# Patient Record
Sex: FEMALE | Race: WHITE | Employment: FULL TIME | URBAN - METROPOLITAN AREA
[De-identification: names, ages, dates, MRNs, and addresses within clinical notes are randomized per-mention and may not be internally consistent; named-entity substitution may affect disease eponyms.]

---

## 2017-12-26 ENCOUNTER — HOSPITAL ENCOUNTER (OUTPATIENT)
Dept: PHYSICAL THERAPY | Age: 52
Discharge: HOME | End: 2017-12-26
Payer: COMMERCIAL

## 2017-12-26 PROCEDURE — 97140 MANUAL THERAPY 1/> REGIONS: CPT

## 2017-12-26 PROCEDURE — 97161 PT EVAL LOW COMPLEX 20 MIN: CPT

## 2017-12-26 NOTE — PROGRESS NOTES
[address]   PHYSICAL THERAPY AT [facility] [address]. [place], [address] - Phone: [phone]  Fax: [id number] / [address] [facility]  Patient Name: Rosanna Dudley : 1965   Medical   Diagnosis: Right hip pain [M25.551] Treatment Diagnosis: S/p R THR   Onset Date: 2017     Referral Source: Inez Garza MD Start of [facility]): 2017   Prior Hospitalization: See medical history Provider #: [id number]   Prior Level of Function: Pain with ADLs   Comorbidities: Arthritis, Hx R RTC repair   Medications: Verified on Patient Summary List   The Plan of Care and following information is based on the information from the initial evaluation.   ================================================================  Assessment / march information: Rosanna Dudley is a 46 y.o.  yo female with Dx of Right hip pain [M25.551]. She reports she underwent r THR on 2017 due to pain from arthritis. She had a 2 week course of home health PT. She presents to out patient PT with c/o mild R hip pain (rated 3/10), SIJ discomfort, and reports of ambulating with deviation due to perceived leg length discrepency. (Patient reports x-rays reveal 2 mm difference). On assessment, Ms. Davies Ames with SC. Ambulation without AD reveals mild lateral list.  R hip ROM and strength are WNLs except R ER= 30 degrees (IR, add, flex not tested). Pelvic landmark assessment reveals l posterior innominate.   FOTO= 42%.      ================================================================  Eval Complexity: History LOW Complexity : Zero comorbidities / personal factors that will impact the outcome / POC;  Examination  MEDIUM Complexity : 3 Standardized tests and measures addressing body structure, function, activity limitation and / or participation in recreation ; Presentation LOW Complexity : Stable, uncomplicated ;  Decision Making MEDIUM Complexity : FOTO score of 26-74; Overall Complexity LOW   Problem List: pain affecting function, decrease ROM, decrease strength, impaired gait/ balance, decrease ADL/ functional abilitiies and decrease activity tolerance   Treatment Plan may include any combination of the following: Therapeutic exercise, Therapeutic activities, Neuromuscular re-education, Physical agent/modality, Gait/balance training, Manual therapy and Patient education  Patient / Family readiness to learn indicated by: asking questions, trying to perform skills and interest  Persons(s) to be included in education: patient (P)  Barriers to Learning/Limitations: None  Measures taken:    Patient Goal (s): Make pain go away, increase mobility/ stability   Patient self reported health status: fair  Rehabilitation Potential: good     Short Term Goals: To be accomplished in  2  weeks:  1 Patient will report >= 25% improvement in symptoms with ADLs  2 Patient will be educated in appropriate ROM, stabilization, strengthening exercises. 3 Improve pelvic symmetry to = landmarks for improved gait   Long Term Goals: To be accomplished in  4-6  weeks:  1 Patient to report >= 75% improvement in symptoms with ADLs. 2 Patient will be independent with finalized HEP/ self maintenance. 3 Increase FOTO score >= 62% to indicate improved function with use of CS.  4 Restore full AROM R hip  for improved ADL participation. Frequency / Duration:   Patient to be seen  2-3  times per week for 4-6  weeks:  Patient / Caregiver education and instruction: self care, activity modification and exercises  G-Codes (GP): paramjit  Therapist Signature: Sean Lr PT Date: 24/77/9394   Certification Period:  Time: 1:25 PM   ===================================================================  I certify that the above Physical Therapy Services are being furnished while the patient is under my care.   I agree with the treatment plan and certify that this therapy is necessary. Physician Signature:        Date:       Time:     Please sign and return to [facility] or you may fax the signed copy to [phone]. Thank you.

## 2017-12-26 NOTE — PROGRESS NOTES
PHYSICAL THERAPY - DAILY TREATMENT NOTE    Patient Name: Callum Borges        Date: 2017  : 1965    Patient  Verified: yes  Visit #:   1     Insurance: Payor: Gayatri Yu / Plan: Coleen Marroquin / Product Type: [employer] /      In time: 82 Out time: 125   Total Treatment Time: 55     Medicare Time Tracking (below)   Total Timed Codes (min):   1:1 Treatment Time:       TREATMENT AREA =  Right hip pain [M25.551]    SUBJECTIVE  Pain Level (on 0 to 10 scale):  3  / 10   Medication Changes/New allergies or changes in medical history, any new surgeries or procedures?     NO    If yes, update Summary List   Subjective Functional Status/Changes:  []  No changes reported     See EVAL/ POC         OBJECTIVE  Modalities Rationale: to decrease pain, edema, neural compromise in order to perform ADLs/ rest with improved ease        min [] Estim, type/location:                                      []  att     []  unatt     []  w/US     []  w/ice    []  w/heat    min []  Mechanical Traction: type/lbs                   []  pro   []  sup   []  int   []  cont    []  before manual    []  after manual    min []  Ultrasound, settings/location:      min []  Iontophoresis w/ dexamethasone, location:                                               []  take home patch       []  in clinic    min []  Ice     []  Heat    location/position:     min []  Vasopneumatic Device, press/temp:     min []  Other:    [] Skin assessment post-treatment (if applicable):    []  intact    []  redness- no adverse reaction     []redness - adverse reaction:        5 min Therapeutic Exercise:  [x]  See flow sheet   Rationale:      increase ROM and increase strength to improve the patients ability to perform ADLs     10 min Manual Therapy: MET correction of L post innominate   Rationale:      decrease pain, increase ROM, increase tissue extensibility and decrease trigger points to improve patient's ability to perform ADLs     min Neuromuscular Re-ed:    Rationale:    improve coordination, improve balance, increase proprioception and improved posture, improve motor control to improve the patients ability to perform ADLs     min Gait Training:    Rationale:       min Patient Education:  YES  Reviewed HEP   []  Progressed/Changed HEP based on: Other Objective/Functional Measures:    See EVAL/ POC     Post Treatment Pain Level (on 0 to 10) scale:   3  / 10     ASSESSMENT      [x]  See POC     PLAN  [x]  Upgrade activities as tolerated  Continue plan of care: yes   []  Discharge due to :    []  Other:      Therapist: Patricia Armenta PT    Date: 12/26/2017 Time: 1:24 PM     No future appointments.

## 2017-12-28 ENCOUNTER — HOSPITAL ENCOUNTER (OUTPATIENT)
Dept: PHYSICAL THERAPY | Age: 52
Discharge: HOME | End: 2017-12-28
Payer: COMMERCIAL

## 2017-12-28 PROCEDURE — 97110 THERAPEUTIC EXERCISES: CPT

## 2017-12-28 PROCEDURE — 97140 MANUAL THERAPY 1/> REGIONS: CPT

## 2017-12-28 NOTE — PROGRESS NOTES
PHYSICAL THERAPY - DAILY TREATMENT NOTE    Patient Name: Radha Amaya        Date: 2017  : 1965    Patient  Verified: YES  Visit #:      of   12  Insurance: Payor: Reyes Singer / Plan: Luana Hester / Product Type: [employer] /      In time: 5:50 Out time: 6:40   Total Treatment Time: 50     Medicare Time Tracking (below)   Total Timed Codes (min):  - 1:1 Treatment Time:  -     TREATMENT AREA/ DIAGNOSIS = Right hip pain [M25.551]    SUBJECTIVE  Pain Level (on 0 to 10 scale):  3  / 10   Medication Changes/New allergies or changes in medical history, any new surgeries or procedures? NO    If yes, update Summary List   Subjective Functional Status/Changes:  [x]  No changes reported     Functional improvement    Functional limitation         10 min Manual Therapy: MET to correct L post innominate, DTM to R thigh, R piriformis release, PROM R hip   Rationale:      decrease pain, increase ROM and increase tissue extensibility to improve patient's ability to perform ADLs without pain    40 min Therapeutic Exercise:  [x]  See flow sheet   Rationale:      increase ROM, increase strength and improve coordination to improve the patients ability to perform ADLs without pain          min Patient Education:  Yes    [x] Reviewed HEP   []  Progressed/Changed HEP based on:         Other Objective/Functional Measures:    Initiated LE strengthening program  Good squat form, but limited range  Pt declined ice, to ice at home   Post Treatment Pain Level (on 0 to 10) scale:    10     ASSESSMENT  Assessment/Changes in Function:     Tolerated all therex well,without pain     []  See Progress Note/Recertification   Patient will continue to benefit from skilled PT services to modify and progress therapeutic interventions, address functional mobility deficits, address ROM deficits, address strength deficits, analyze and address soft tissue restrictions, analyze and cue movement patterns and analyze and modify body mechanics/ergonomics to attain remaining goals.    Progress toward goals / Updated goals:    Good PROM     PLAN  [x]  Upgrade activities as tolerated YES Continue plan of care   []  Discharge due to :    []  Other:      Therapist: Silvestre Hamilton PT    Date: 12/28/2017 Time: 6:59 PM        Future Appointments  Date Time Provider Concetta Peraza   1/5/2018 3:30 PM Silvestre Hamilton, PT REHAB [facility] AT [facility]   1/8/2018 3:30 PM Kervin Ellis, PT [facility] AT [facility]   1/10/2018 2:30 PM Silvestre Hamilton, PT [facility] AT [facility]   1/12/2018 3:30 PM Silvestre Hamilton, PT [facility] AT [facility]   1/15/2018 3:30 PM Kervin Mayfieldt, PT [facility] AT [facility]   1/17/2018 3:00 PM Silvestre Hamilton, PT [facility]   1/19/2018 3:00 PM Silvestre Hamilton, PT [facility]   1/22/2018 3:30 PM Kervin Caleb, PT [facility]   1/24/2018 3:00 PM Silvestre Hamilton, PT [facility]   1/26/2018 3:00 PM Silvestre Hamilton, PT [facility] AT [facility]   1/29/2018 5:00 PM Kervin Caleb, PT [facility] AT [facility]   1/31/2018 5:00 PM Kervin Caleb, PT RE[facility] AT [facility]

## 2018-01-08 ENCOUNTER — HOSPITAL ENCOUNTER (OUTPATIENT)
Dept: PHYSICAL THERAPY | Age: 53
Discharge: HOME | End: 2018-01-08
Payer: COMMERCIAL

## 2018-01-08 PROCEDURE — 97110 THERAPEUTIC EXERCISES: CPT

## 2018-01-08 PROCEDURE — 97140 MANUAL THERAPY 1/> REGIONS: CPT

## 2018-01-08 NOTE — PROGRESS NOTES
PHYSICAL THERAPY - DAILY TREATMENT NOTE    Patient Name: Devika Landers        Date: 2018  : 1965   YES Patient  Verified  Visit #:   3   of   12  Insurance: Payor: Michaela Lauren / Plan: Tato Bear / Product Type: [employer] /      In time: 335 Out time: 435   Total Treatment Time: 60     Medicare Time Tracking (below)   Total Timed Codes (min):   1:1 Treatment Time:       TREATMENT AREA = Right hip pain [M25.551]    SUBJECTIVE  Pain Level (on 0 to 10 scale):  2  / 10   Medication Changes/New allergies or changes in medical history, any new surgeries or procedures? NO    If yes, update Summary List   Subjective Functional Status/Changes:  []  No changes reported     Not too bad right now        OBJECTIVE    40 min Therapeutic Exercise:  [x]  See flow sheet   Rationale:      increase ROM and increase strength to improve the patients ability to amb/ perform ADLs and work activity     20 min Manual Therapy: Technique:      [] STM[]IASTM[x]TPR[]PROM[] Stretching  [] SOR[] man traction[x] MET for pelvic alignment  []OP with REIL  []Jt manipulation []Gr I [] II []  III [] IV[] V[]    Treatment Area: R hip/ ITB / piriformis   Rationale:      decrease pain, increase ROM, increase tissue extensibility and decrease trigger points to improve patient's ability to amb/ perform work activity          throughout Rx min Patient Education:  YES  Reviewed HEP   []  Progressed/Changed HEP based on:            Other Objective/Functional Measures:    R ant innominate- improved symmetry after manual  TP in piriformis     Post Treatment Pain Level (on 0 to 10) scale:   2-3  / 10     ASSESSMENT  Assessment/Changes in Function:     Demos good technique with there ex     []  See Progress Note/Recertification   Patient will continue to benefit from skilled PT services to modify and progress therapeutic interventions, address functional mobility deficits, address ROM deficits, address strength deficits, analyze and address soft tissue restrictions, analyze and cue movement patterns and assess and modify postural abnormalities to attain remaining goals.    Progress toward goals / Updated goals:    Progressing steadily     PLAN  [x]  Upgrade activities as tolerated YES Continue plan of care   []  Discharge due to :    []  Other:      Therapist: Subhash Doty PT    Date: 1/8/2018 Time: 3:37 PM     Future Appointments  Date Time Provider Concetta Peraza   1/10/2018 2:30 PM Nell Melara PT [facility]   1/12/2018 3:30 PM Nell Melara PT [facility]   1/15/2018 3:30 PM Subhash Doty PT [facility]   1/17/2018 3:00 PM Nell Melara PT [facility]   1/19/2018 3:00 PM Nell Melara PT [facility]   1/22/2018 3:30 PM Subhash Doty PT [facility]   1/24/2018 3:00 PM Nell Melara PT [facility]   1/26/2018 3:00 PM Nell Melara PT [facility]   1/29/2018 5:00 PM Subhash Doty PT [facility]   1/31/2018 5:00 PM Subhash Doty PT [facility] AT [facility]

## 2018-01-10 ENCOUNTER — HOSPITAL ENCOUNTER (OUTPATIENT)
Dept: PHYSICAL THERAPY | Age: 53
Discharge: HOME | End: 2018-01-10
Payer: COMMERCIAL

## 2018-01-10 PROCEDURE — 97110 THERAPEUTIC EXERCISES: CPT

## 2018-01-10 PROCEDURE — 97140 MANUAL THERAPY 1/> REGIONS: CPT

## 2018-01-10 NOTE — PROGRESS NOTES
PHYSICAL THERAPY - DAILY TREATMENT NOTE    Patient Name: Jer Ramirez        Date: 1/10/2018  : 1965   YES Patient  Verified  Visit #:     Insurance: Payor: Sari Perdomo / Plan: Vira Villagran / Product Type: [employer] /      In time: 230 Out time: 325   Total Treatment Time: 55     Medicare Time Tracking (below)   Total Timed Codes (min):   1:1 Treatment Time:       TREATMENT AREA = Right hip pain [M25.551]    SUBJECTIVE  Pain Level (on 0 to 10 scale):  3  / 10   Medication Changes/New allergies or changes in medical history, any new surgeries or procedures? NO    If yes, update Summary List   Subjective Functional Status/Changes:  []  No changes reported     Doing stretches. Did splits yesterday       OBJECTIVE    40 min Therapeutic Exercise:  [x]  See flow sheet   Rationale:      increase ROM, increase strength and improve balance to improve the patients ability to perform recreation activity/ work activity with less pain     15 min Manual Therapy: Technique:      [x] STM[]IASTM[x]TPR[]PROM[] Stretching  [] SOR[] man traction[x] roller to ITB  [x]MET for pelvic symmetry  []Jt manipulation []Gr I [] II []  III [] IV[] V[]    Treatment Area:  Piriformis/ ITB   Rationale:      decrease pain, increase ROM, increase tissue extensibility and decrease trigger points to improve patient's ability to perform ADLs with less pain             min Gait Training:    Rationale:        throughout Rx min Patient Education:  YES  Reviewed HEP   []  Progressed/Changed HEP based on:            Other Objective/Functional Measures:    fatiques easily with hip ER     Post Treatment Pain Level (on 0 to 10) scale:   3-4  / 10     ASSESSMENT  Assessment/Changes in Function:     Functional improvement:  Demos good form with there ex   Functional limitation:  Not working      []  See Progress Note/Recertification   Patient will continue to benefit from skilled PT services to modify and progress therapeutic interventions, address functional mobility deficits, address ROM deficits, address strength deficits, analyze and address soft tissue restrictions and address imbalance/dizziness to attain remaining goals.    Progress toward goals / Updated goals:    Progressing steadily     PLAN  []  Upgrade activities as tolerated YES Continue plan of care   []  Discharge due to :    []  Other:      Therapist: Subhash Doty PT    Date: 1/10/2018 Time: 2:31 PM     Future Appointments  Date Time Provider Concetta Peraza   1/12/2018 3:30 PM Nell Melara PT [facility]   1/15/2018 3:30 PM Subhash Doty PT [facility]   1/17/2018 3:00 PM Nell Melara PT [facility]   1/19/2018 3:00 PM Nell Melara PT [facility]   1/22/2018 3:30 PM Subhash Doty PT [facility]   1/24/2018 3:00 PM Nell Melara PT [facility]   1/26/2018 3:00 PM Nell Melara PT [facility]   1/29/2018 5:00 PM Subhash Doty PT [facility]   1/31/2018 5:00 PM Subhash Doty PT [facility] AT [facility]

## 2018-01-12 ENCOUNTER — HOSPITAL ENCOUNTER (OUTPATIENT)
Dept: PHYSICAL THERAPY | Age: 53
Discharge: HOME | End: 2018-01-12
Payer: COMMERCIAL

## 2018-01-12 PROCEDURE — 97110 THERAPEUTIC EXERCISES: CPT

## 2018-01-12 PROCEDURE — 97140 MANUAL THERAPY 1/> REGIONS: CPT

## 2018-01-12 NOTE — PROGRESS NOTES
PHYSICAL THERAPY - DAILY TREATMENT NOTE    Patient Name: Srinivas Living        Date: 2018  : 1965    Patient  Verified: YES  Visit #:   3:15   of   4:35  Insurance: Payor: Horacio Watts / Plan: Isamar Calles / Product Type: [employer] /      In time: 3:15 Out time: 4:35   Total Treatment Time: 65     Medicare Time Tracking (below)   Total Timed Codes (min):  - 1:1 Treatment Time:  -     TREATMENT AREA/ DIAGNOSIS = Right hip pain [M25.551]    SUBJECTIVE  Pain Level (on 0 to 10 scale):  4  / 10   Medication Changes/New allergies or changes in medical history, any new surgeries or procedures? NO    If yes, update Summary List   Subjective Functional Status/Changes:  []  No changes reported     Functional improvement    Functional limitation My r hip hurts      OBJECTIVE  Modalities Rationale:     decrease edema, decrease inflammation and decrease pain to improve patient's ability to perform ADLs without pain   min [] Estim, type/location:                                      []  att     []  unatt     []  w/US     []  w/ice    []  w/heat    min []  Mechanical Traction: type/lbs                   []  pro   []  sup   []  int   []  cont    []  before manual    []  after manual    min []  Ultrasound, settings/location:      min []  Iontophoresis w/ dexamethasone, location:                                               []  take home patch       []  in clinic   10 min [x]  Ice     []  Heat    location/position:  To low back and R hip    min []  Vasopneumatic Device, press/temp:     min []  Other:    [x] Skin assessment post-treatment (if applicable):    [x]  intact    [x]  redness- no adverse reaction     []redness - adverse reaction:        10 min Manual Therapy: MET to correct R ant innominate, R psoas release, DTM to R thigh   Rationale:      decrease pain, increase ROM and increase tissue extensibility to improve patient's ability to perform ADLs without pain    40 min Therapeutic Exercise:  [x] See flow sheet   Rationale:      increase ROM and increase strength to improve the patients ability to perform ADLs without pain          min Patient Education:  Yes    [x] Reviewed HEP   []  Progressed/Changed HEP based on: Other Objective/Functional Measures:    Pt with R psoas tightness and R hip pain  Pt with R LE longer in supine and R ASIS lower (R ant innominate), which was corrected with MET  Her R ant innominate returned with therex and was again corrected with MET    Post Treatment Pain Level (on 0 to 10) scale:   3  / 10     ASSESSMENT  Assessment/Changes in Function:     Less pain with correction of R ant inominate  Pt with full PROM in R hip, needs to focus on strength      []  See Progress Note/Recertification   Patient will continue to benefit from skilled PT services to modify and progress therapeutic interventions, address functional mobility deficits, address ROM deficits, address strength deficits, analyze and address soft tissue restrictions, analyze and cue movement patterns and analyze and modify body mechanics/ergonomics to attain remaining goals.    Progress toward goals / Updated goals:    Full PROM     PLAN  [x]  Upgrade activities as tolerated YES Continue plan of care   []  Discharge due to :    []  Other:      Therapist: Cyndi Pereira PT    Date: 1/12/2018 Time: 3:18 PM        Future Appointments  Date Time Provider Concetta Peraza   1/12/2018 3:30 PM Cyndi Pereira PT [facility]   1/15/2018 3:30 PM Aydee Mtz PT [facility]   1/17/2018 3:00 PM Cyndi Pereira PT [facility]   1/19/2018 3:00 PM Cyndi Pereira PT [facility]   1/22/2018 3:30 PM Aydee Mtz PT [facility]   1/24/2018 3:00 PM Cyndi Pereira PT [facility]   1/26/2018 3:00 PM Cyndi Pereira PT [facility]   1/29/2018 5:00 PM Aydee Mtz PT [facility]   1/31/2018 5:00 PM Aydee Mtz, PT [facility] AT [facility]

## 2018-01-15 ENCOUNTER — HOSPITAL ENCOUNTER (OUTPATIENT)
Dept: PHYSICAL THERAPY | Age: 53
Discharge: HOME | End: 2018-01-15
Payer: COMMERCIAL

## 2018-01-15 PROCEDURE — 97110 THERAPEUTIC EXERCISES: CPT

## 2018-01-15 PROCEDURE — 97140 MANUAL THERAPY 1/> REGIONS: CPT

## 2018-01-15 NOTE — PROGRESS NOTES
PHYSICAL THERAPY - DAILY TREATMENT NOTE    Patient Name: Jeffery Solomon        Date: 1/15/2018  : 1965   YES Patient  Verified  Visit #:     Insurance: Payor: Bo Randolph / Plan: Elpidio Calderon / Product Type: [employer] /      In time: 320 Out time: 430   Total Treatment Time: 70     Medicare Time Tracking (below)   Total Timed Codes (min):   1:1 Treatment Time:       TREATMENT AREA = Right hip pain [M25.551]    SUBJECTIVE  Pain Level (on 0 to 10 scale):  3-4   10   Medication Changes/New allergies or changes in medical history, any new surgeries or procedures? NO    If yes, update Summary List   Subjective Functional Status/Changes:  []  No changes reported     R hip flexor is just too tight        OBJECTIVE    50 min Therapeutic Exercise:  [x]  See flow sheet   Rationale:      increase ROM and increase strength to improve the patients ability to amb/ return to work outsd     20 min Manual Therapy: Technique:      [x] STM[]IASTM[x]TPR[]PROM[x] Stretching- hip flexors  [] SOR[] man traction[]   []OP with REIL  []Jt manipulation []Gr I [] II []  III [] IV[] V[]    Treatment Area: psoas, piriformis/ ITB    Rationale:      decrease pain, increase ROM, increase tissue extensibility and decrease trigger points to improve patient's ability to perform ADLs with less pain              throughout Rx min Patient Education:  YES  Reviewed HEP   []  Progressed/Changed HEP based on:            Other Objective/Functional Measures:    Tight r psoas     Post Treatment Pain Level (on 0 to 10) scale:   3-4   10     ASSESSMENT  Assessment/Changes in Function:     Functional improvement:  Easier to get in/ out of car, able to walk longer distances, able to sit on floor   Functional limitation:  Discomfort on stairs      []  See Progress Note/Recertification   Patient will continue to benefit from skilled PT services to modify and progress therapeutic interventions, address functional mobility deficits, address ROM deficits, address strength deficits, analyze and address soft tissue restrictions and address imbalance/dizziness to attain remaining goals.    Progress toward goals / Updated goals:    Progressing function     PLAN  [x]  Upgrade activities as tolerated YES Continue plan of care   []  Discharge due to :    []  Other:      Therapist: Yvette Caro PT    Date: 1/15/2018 Time: 3:25 PM     Future Appointments  Date Time Provider Concetta Peraza   1/15/2018 3:30 PM Yvette Caro PT [facility]   1/17/2018 3:00 PM Ariadna Flores, PT [facility] AT [facility]   1/19/2018 3:00 PM Ariadna Flores, PT [facility]   1/22/2018 3:30 PM Yvette Caro PT [facility] AT [facility]   1/24/2018 3:00 PM Ariadna Flores, PT [facility]   1/26/2018 3:00 PM Ariadna Flores, PT [facility] AT [facility]   1/29/2018 5:00 PM Yvette Caro PT [facility] AT [facility]   1/31/2018 5:00 PM Yvette Caro PT [facility] AT [facility]

## 2018-01-17 ENCOUNTER — HOSPITAL ENCOUNTER (OUTPATIENT)
Dept: PHYSICAL THERAPY | Age: 53
Discharge: HOME | End: 2018-01-17
Payer: COMMERCIAL

## 2018-01-17 PROCEDURE — 97110 THERAPEUTIC EXERCISES: CPT

## 2018-01-17 PROCEDURE — 97140 MANUAL THERAPY 1/> REGIONS: CPT

## 2018-01-17 NOTE — PROGRESS NOTES
PHYSICAL THERAPY - DAILY TREATMENT NOTE    Patient Name: Gisela Rendon        Date: 2018  : 1965    Patient  Verified: YES  Visit #:     Insurance: Payor: Nelsy Jansen / Plan: Guero Joannbill / Product Type: [employer] /      In time: 2:55 Out time: 3:45   Total Treatment Time: 50     Medicare Time Tracking (below)   Total Timed Codes (min):  - 1:1 Treatment Time:  -     TREATMENT AREA/ DIAGNOSIS = Right hip pain [M25.551]    SUBJECTIVE  Pain Level (on 0 to 10 scale):  3   10   Medication Changes/New allergies or changes in medical history, any new surgeries or procedures? NO    If yes, update Summary List   Subjective Functional Status/Changes:  []  No changes reported     Functional improvement    Functional limitation my low back is sore        10 min Manual Therapy: MET to correct L post innominate   Rationale:      decrease pain, increase ROM and increase tissue extensibility to improve patient's ability to perform ADLs without pain    40 min Therapeutic Exercise:  [x]  See flow sheet   Rationale:      increase ROM, increase strength and improve balance to improve the patients ability to perform ADLs without pain          min Patient Education:  Yes    [x] Reviewed HEP   []  Progressed/Changed HEP based on: Other Objective/Functional Measures:    Pt with L post innominate corrected with MET   Post Treatment Pain Level (on 0 to 10) scale:   2   10     ASSESSMENT  Assessment/Changes in Function:     Pt with LBP from L post innominate     []  See Progress Note/Recertification   Patient will continue to benefit from skilled PT services to modify and progress therapeutic interventions, address functional mobility deficits, address ROM deficits, address strength deficits, analyze and address soft tissue restrictions, analyze and cue movement patterns and analyze and modify body mechanics/ergonomics to attain remaining goals.    Progress toward goals / Updated goals:    Improving pain     PLAN  [x]  Upgrade activities as tolerated YES Continue plan of care   []  Discharge due to :    []  Other:      Therapist: Agnes Rodrigez PT    Date: 1/17/2018 Time: 4:39 PM        Future Appointments  Date Time Provider Concetta Peraza   1/19/2018 3:00 PM Agnes Rodrigez PT REHAB [facility] AT [facility]   1/22/2018 3:30 PM Perry Cea, PT REHAB CENTER AT [facility]   1/24/2018 3:00 PM Agnes Rodrigez PT REHAB CENTER AT [facility]   1/26/2018 3:00 PM Agnes Rodrigez PT REHAB CENTER AT [facility]   1/29/2018 5:00 PM Perry Galvan, PT REHAB CENTER AT [facility]   1/31/2018 5:00 PM Perry Cea, PT REHAB CENTER AT [facility]

## 2018-01-19 ENCOUNTER — APPOINTMENT (OUTPATIENT)
Dept: PHYSICAL THERAPY | Age: 53
End: 2018-01-19
Payer: COMMERCIAL

## 2018-01-22 ENCOUNTER — HOSPITAL ENCOUNTER (OUTPATIENT)
Dept: PHYSICAL THERAPY | Age: 53
Discharge: HOME | End: 2018-01-22
Payer: COMMERCIAL

## 2018-01-22 PROCEDURE — 97110 THERAPEUTIC EXERCISES: CPT

## 2018-01-22 PROCEDURE — 97140 MANUAL THERAPY 1/> REGIONS: CPT

## 2018-01-22 NOTE — PROGRESS NOTES
PHYSICAL THERAPY - DAILY TREATMENT NOTE    Patient Name: Rishi Kwong        Date: 2018  : 1965   YES Patient  Verified  Visit #:     Insurance: Payor: Rodney Burt / Plan: Farheen Judd / Product Type: [employer] /      In time: 325 Out time: 415   Total Treatment Time: 50     Medicare Time Tracking (below)   Total Timed Codes (min):   1:1 Treatment Time:       TREATMENT AREA = Right hip pain [M25.551]    SUBJECTIVE  Pain Level (on 0 to 10 scale):  3-4  / 10   Medication Changes/New allergies or changes in medical history, any new surgeries or procedures? NO    If yes, update Summary List   Subjective Functional Status/Changes:  []  No changes reported     Sore, but did a lot over the weekend. Planning to RTW tomorrow. Walked 5 miles on Sat and then 6.2 on Sun        OBJECTIVE    35 min Therapeutic Exercise:  [x]  See flow sheet   Rationale:      increase ROM, increase strength and improve balance to improve the patients ability to amb/ perform rec activity     15 min Manual Therapy: Technique:      [x] STM[]IASTM[x]TPR[]PROM[] Stretching  [] SOR[] man traction[]   []OP with REIL  []Jt manipulation []Gr I [] II []  III [] IV[] V[]    Treatment Area:  R glut/ quad region, hip flexor   Rationale:      decrease pain, increase ROM, increase tissue extensibility and increase postural awareness to improve patient's ability to perform ADLs/ work/ rec activity        throughout Rx min Patient Education:  YES  Reviewed HEP   []  Progressed/Changed HEP based on:            Other Objective/Functional Measures:    Quad fatiques easily     Post Treatment Pain Level (on 0 to 10) scale:   4  / 10     ASSESSMENT  Assessment/Changes in Function:     Functional improvement:  RTW tomorrow, increased amb duration        []  See Progress Note/Recertification   Patient will continue to benefit from skilled PT services to modify and progress therapeutic interventions, address functional mobility deficits, address ROM deficits, address strength deficits, analyze and address soft tissue restrictions, analyze and cue movement patterns and address imbalance/dizziness to attain remaining goals.    Progress toward goals / Updated goals:    Steady progression     PLAN  [x]  Upgrade activities as tolerated YES Continue plan of care   []  Discharge due to :    []  Other:      Therapist: Subhash Doty PT    Date: 1/22/2018 Time: 3:26 PM     Future Appointments  Date Time Provider Concetta Peraza   1/22/2018 3:30 PM Subhash Doty PT REH[facility] AT [facility]   1/24/2018 3:00 PM Nell Melara PT RE[facility]ENTER AT [facility]   1/26/2018 3:00 PM Nell Melara PT RE[facility]ENTER AT [facility]   1/29/2018 5:00 PM Subhash Doty PT REH[facility]ENTER AT [facility]   1/31/2018 5:00 PM Subhash Doty PT REHAB CENTER AT [facility]

## 2018-01-24 ENCOUNTER — HOSPITAL ENCOUNTER (OUTPATIENT)
Dept: PHYSICAL THERAPY | Age: 53
Discharge: HOME | End: 2018-01-24
Payer: COMMERCIAL

## 2018-01-24 PROCEDURE — 97140 MANUAL THERAPY 1/> REGIONS: CPT

## 2018-01-24 PROCEDURE — 97110 THERAPEUTIC EXERCISES: CPT

## 2018-01-24 NOTE — PROGRESS NOTES
PHYSICAL THERAPY - DAILY TREATMENT NOTE    Patient Name: Pratima Calvillo        Date: 2018  : 1965    Patient  Verified: YES  Visit #:     Insurance: Payor: Luis Codding / Plan: Coni Galeana / Product Type: [employer] /      In time: 3:00 Out time: 4:20   Total Treatment Time: 80     Medicare Time Tracking (below)   Total Timed Codes (min):  - 1:1 Treatment Time:  -     TREATMENT AREA/ DIAGNOSIS = Right hip pain [M25.551]    SUBJECTIVE  Pain Level (on 0 to 10 scale):  3  / 10   Medication Changes/New allergies or changes in medical history, any new surgeries or procedures? NO    If yes, update Summary List   Subjective Functional Status/Changes:  []  No changes reported     Functional improvement: Pt reports that she has notice an improvement with a lot of daily things like walking longer distances and turning directions while walking. 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        OBJECTIVE  Modalities Rationale:     decrease pain to improve patient's ability to perform ADLs without pain   min [] Estim, type/location:                                      []  att     []  unatt     []  w/US     []  w/ice    []  w/heat    min []  Mechanical Traction: type/lbs                   []  pro   []  sup   []  int   []  cont    []  before manual    []  after manual    min []  Ultrasound, settings/location:      min []  Iontophoresis w/ dexamethasone, location:                                               []  take home patch       []  in clinic   10 min [x]  Ice     []  Heat    location/position: Low back and right hip laying supine    min []  Vasopneumatic Device, press/temp:     min []  Other:    [] Skin assessment post-treatment (if applicable):    []  intact    []  redness- no adverse reaction     []redness - adverse reaction:        15 min Manual Therapy: MET to correct a posteriorly rotated left innominate and anteriorly rotated right innominate   Rationale:      Correct pelvic positioning to improve patient's ability to perform ADLs without pain    50 min Therapeutic Exercise:  [x]  See flow sheet   Rationale:      increase strength, improve coordination, improve balance and increase proprioception to improve the patients ability to perform ADLs without pain          min Patient Education:  Yes    [x] Reviewed HEP   []  Progressed/Changed HEP based on: Other Objective/Functional Measures:    R hip PROM WNL  Left posteriorly rotated innominate  Right anteriorly rotated innominate  FOTO: 53   Post Treatment Pain Level (on 0 to 10) scale:   4  / 10     ASSESSMENT  Assessment/Changes in Function:     Pt shows improvements with therapeutic exercise based ability to perform tasks with increased resistance. Pt still shows decreased hip abduction and extension weakness leading to slight impairments during ambulation. Pt showed decreased core activation. []  See Progress Note/Recertification   Patient will continue to benefit from skilled PT services to modify and progress therapeutic interventions, address functional mobility deficits, address ROM deficits, address strength deficits, analyze and address soft tissue restrictions, analyze and cue movement patterns and analyze and modify body mechanics/ergonomics to attain remaining goals. Progress toward goals / Updated goals:    Continue with current treatment plan targeting R hip strengthening and core strengthening/stabilization.        PLAN  [x]  Upgrade activities as tolerated YES Continue plan of care   []  Discharge due to :    []  Other:      Therapist: Lucille Klein    Date: 1/24/2018 Time: 4:15 PM        Future Appointments  Date Time Provider Concetta Peraza   1/26/2018 3:00 PM Maynor Smith PT REHAB CENTER AT [facility]   1/29/2018 5:00 PM Tiana Desai PT REHAB CENTER AT [facility]   1/31/2018 5:00 PM Tiana Desai, PT REHAB CENTER AT [facility]

## 2018-01-24 NOTE — PROGRESS NOTES
[facility] PHYSICAL THERAPY AT [facility] [address] [place], [address]  Phone: [phone]  Fax: [phone]  PROGRESS NOTE  Patient Name: Yordan Tinsley : 1965   Treatment/Medical Diagnosis: Right hip pain [M25.551]   Referral Source: Kanu Thurston MD     Date of Initial Visit: 17 Attended Visits: 9 Missed Visits: 0     SUMMARY OF TREATMENT  Manual therapy, including massage, PROM and Muscle-energy techniques to correct L posterior innominate. Core stability program.  LE strengthening program, with focus on glutes. Education on HEP and self-care. CURRENT STATUS  The patient has made good progress. SHe has full PROM. Her AROM is full as well, except for acitve ER, which is limited to 20 degrees. Her progress has been limited by a recurrent L post innominate, which we are able to correct, but quickly goes back out of alignment. Goal/Measure of Progress Goal Met? 1.  pt to report 75% improvement   Status at last Eval: - Current Status: 35% improved progressing   2. Independent with HEP   Status at last Eval: No HEP Current Status: Learning a good HEP progressing   3. Increase FOTO score to >=62%, to indicate increased function    Status at last Eval: 42 Current Status: 53 progressing   4. Restore full AROM to R hip   Status at last Eval: Limited R hip AROM Current Status: Full R hip AROM, except for ER = 20  progressing     New Goals to be achieved in __4-5__  weeks:  1. All 4 above LTGs still valid   2.  -   3.  -   4.  -   RECOMMENDATIONS  Continue PT 2x/week x 4-5 weeks  If you have any questions/comments please contact us directly at [phone]. Thank you for allowing us to assist in the care of your patient. Therapist Signature: Lynett Snellen, PT, MDT Date: 2018     Time: 3:58 PM   NOTE TO PHYSICIAN:  PLEASE COMPLETE THE ORDERS BELOW AND FAX TO   [facility] [place]: [phone].   If you are unable to process this request in 24 hours please contact our office: [phone].    ___ I have read the above report and request that my patient continue as recommended.   ___ I have read the above report and request that my patient continue therapy with the following changes/special instructions:_________________________________________________________   ___ I have read the above report and request that my patient be discharged from therapy.      Physician Signature:        Date:       Time:

## 2018-01-26 ENCOUNTER — HOSPITAL ENCOUNTER (OUTPATIENT)
Dept: PHYSICAL THERAPY | Age: 53
Discharge: HOME | End: 2018-01-26
Payer: COMMERCIAL

## 2018-01-26 PROCEDURE — 97110 THERAPEUTIC EXERCISES: CPT

## 2018-01-26 PROCEDURE — 97140 MANUAL THERAPY 1/> REGIONS: CPT

## 2018-01-26 NOTE — PROGRESS NOTES
PHYSICAL THERAPY - DAILY TREATMENT NOTE    Patient Name: Pam Cam        Date: 2018  : 1965    Patient  Verified: YES  Visit #:   10   of   12  Insurance: Payor: Drea Banda / Plan: Rusty Conte / Product Type: [employer] /      In time: 3 Out time: 4:20   Total Treatment Time: 80     Medicare Time Tracking (below)   Total Timed Codes (min):  - 1:1 Treatment Time:  -     TREATMENT AREA/ DIAGNOSIS = Right hip pain [M25.551]    SUBJECTIVE  Pain Level (on 0 to 10 scale):  3  / 10   Medication Changes/New allergies or changes in medical history, any new surgeries or procedures?     NO    If yes, update Summary List   Subjective Functional Status/Changes:  []  No changes reported     Functional improvement it feels better than it did this morning   Functional limitation pain in the morning      OBJECTIVE  Modalities Rationale:     decrease edema, decrease inflammation and decrease pain to improve patient's ability to perform ADLs without pain   min [] Estim, type/location:                                      []  att     []  unatt     []  w/US     []  w/ice    []  w/heat    min []  Mechanical Traction: type/lbs                   []  pro   []  sup   []  int   []  cont    []  before manual    []  after manual    min []  Ultrasound, settings/location:      min []  Iontophoresis w/ dexamethasone, location:                                               []  take home patch       []  in clinic   10 min [x]  Ice     []  Heat    location/position:     min []  Vasopneumatic Device, press/temp:     min []  Other:    [x] Skin assessment post-treatment (if applicable):    [x]  intact    [x]  redness- no adverse reaction     []redness - adverse reaction:        10 min Manual Therapy: MET to correct R ant innominate X 2, R psoas release   Rationale:      decrease pain, increase ROM and increase tissue extensibility to improve patient's ability to perform ADLs without pain    60 min Therapeutic Exercise: [x]  See flow sheet   Rationale:      increase ROM and increase strength to improve the patients ability to perform ADLs without pain          min Patient Education:  Yes    [x] Reviewed HEP   []  Progressed/Changed HEP based on: Other Objective/Functional Measures:    Pt with R ant innominate, likely related to R psoas shortening  Increased PREs, adding balance lunge and deep squat  Pt was back out of alingement after mat exercises (glute prep), with R ant innominate corrected again  Pt was able to maintain alingement with PREs     Post Treatment Pain Level (on 0 to 10) scale:   2  / 10     ASSESSMENT  Assessment/Changes in Function:     Hard to maintain alingement  Pt instructed how to self correct R SI with home MET of pushing R foot (in 90/90 position) into door jam      []  See Progress Note/Recertification   Patient will continue to benefit from skilled PT services to modify and progress therapeutic interventions, address functional mobility deficits, address ROM deficits, address strength deficits, analyze and address soft tissue restrictions, analyze and cue movement patterns, analyze and modify body mechanics/ergonomics and assess and modify postural abnormalities to attain remaining goals.    Progress toward goals / Updated goals:    progressing well     PLAN  [x]  Upgrade activities as tolerated YES Continue plan of care   []  Discharge due to :    []  Other:      Therapist: Lynett Snellen, PT    Date: 1/26/2018 Time: 3:01 PM        Future Appointments  Date Time Provider Concetta Peraza   1/29/2018 5:00 PM Hugh Darling, PT REHAB CENTER AT [facility]   1/31/2018 5:00 PM Hugh Darling, PT REH[facility] CENTER AT [facility]

## 2018-01-29 ENCOUNTER — HOSPITAL ENCOUNTER (OUTPATIENT)
Dept: PHYSICAL THERAPY | Age: 53
Discharge: HOME | End: 2018-01-29
Payer: COMMERCIAL

## 2018-01-29 PROCEDURE — 97140 MANUAL THERAPY 1/> REGIONS: CPT

## 2018-01-29 PROCEDURE — 97110 THERAPEUTIC EXERCISES: CPT

## 2018-01-29 NOTE — PROGRESS NOTES
PHYSICAL THERAPY - DAILY TREATMENT NOTE    Patient Name: Zarina Larson        Date: 2018  : 1965   YES Patient  Verified  Visit #:     Insurance: Payor: Mik Chua / Plan: Dicie Hammans / Product Type: [employer] /      In time: 417 Out time: 550   Total Treatment Time: 54     Medicare Time Tracking (below)   Total Timed Codes (min):   1:1 Treatment Time:       TREATMENT AREA = Right hip pain [M25.551]    SUBJECTIVE  Pain Level (on 0 to 10 scale):  3  / 10   Medication Changes/New allergies or changes in medical history, any new surgeries or procedures? NO    If yes, update Summary List   Subjective Functional Status/Changes:  []  No changes reported     Sore after last workout. I need the psoas to release        OBJECTIVE    40 min Therapeutic Exercise:  [x]  See flow sheet   Rationale:      increase ROM, increase strength and improve coordination to improve the patients ability to amb/ perform recreation and work activity     15 min Manual Therapy: Technique:      [x] STM[]IASTM[x]TPR[]PROM[x] Stretching- psoas[] SOR[] man traction[]   []OP with REIL  []Jt manipulation []Gr I [] II []  III [] IV[] V[]    Treatment Area:  Psoas release,    Rationale:      decrease pain, increase ROM, increase tissue extensibility and decrease trigger points to improve patient's ability to perform work/ recreation activity              throughout Rx min Patient Education:  YES  Reviewed HEP   []  Progressed/Changed HEP based on: Other Objective/Functional Measures:    Progressing stability thru R LE.   Added SLS clocks     Post Treatment Pain Level (on 0 to 10) scale:   3- 10     ASSESSMENT  Assessment/Changes in Function:     Functional improvement:  RTW        []  See Progress Note/Recertification   Patient will continue to benefit from skilled PT services to modify and progress therapeutic interventions, address functional mobility deficits, address ROM deficits, address strength deficits, analyze and address soft tissue restrictions and address imbalance/dizziness to attain remaining goals.    Progress toward goals / Updated goals:    Steady progress     PLAN  [x]  Upgrade activities as tolerated YES Continue plan of care   []  Discharge due to :    []  Other:      Therapist: Kim Ferguson PT    Date: 1/29/2018 Time: 5:17 PM     Future Appointments  Date Time Provider Concetta Peraza   1/31/2018 5:00 PM Kim Ferguson PT REH[facility]ENTER AT [facility]

## 2018-01-31 ENCOUNTER — HOSPITAL ENCOUNTER (OUTPATIENT)
Dept: PHYSICAL THERAPY | Age: 53
Discharge: HOME | End: 2018-01-31
Payer: COMMERCIAL

## 2018-01-31 PROCEDURE — 97140 MANUAL THERAPY 1/> REGIONS: CPT

## 2018-01-31 PROCEDURE — 97110 THERAPEUTIC EXERCISES: CPT

## 2018-01-31 NOTE — PROGRESS NOTES
PHYSICAL THERAPY - DAILY TREATMENT NOTE    Patient Name: Jayson Taylor        Date: 2018  : 1965   YES Patient  Verified  Visit #:     Insurance: Payor: Tarah Collier / Plan: Valeriano Norris / Product Type: [employer] /      In time: 769 Out time: 600   Total Treatment Time: 65     Medicare Time Tracking (below)   Total Timed Codes (min):   1:1 Treatment Time:       TREATMENT AREA = Right hip pain [M25.551]    SUBJECTIVE  Pain Level (on 0 to 10 scale):  1-2   10   Medication Changes/New allergies or changes in medical history, any new surgeries or procedures? NO    If yes, update Summary List   Subjective Functional Status/Changes:  []  No changes reported     Sore, but getting better         OBJECTIVE    50 min Therapeutic Exercise:  [x]  See flow sheet   Rationale:      increase ROM, increase strength and improve balance to improve the patients ability to perform recreation/ work activity     15 min Manual Therapy: Technique:      [x] STM[]IASTM[x]TPR[]PROM[x] Stretching  [] SOR[] man traction[]   []OP with REIL  []Jt manipulation []Gr I [] II []  III [] IV[] V[]    Treatment Area:  R hip/ psoas   Rationale:      decrease pain, increase ROM, increase tissue extensibility and decrease trigger points to improve patient's ability to return to rec activity/ work           throughout Rx min Patient Education:  YES  Reviewed HEP   []  Progressed/Changed HEP based on:            Other Objective/Functional Measures:    Tight psoas  Able to perform 6 inch lat dip down with good form   Post Treatment Pain Level (on 0 to 10) scale:   3  / 10     ASSESSMENT  Assessment/Changes in Function:     Functional improvement:  Improved stability with SLS clocks        []  See Progress Note/Recertification   Patient will continue to benefit from skilled PT services to modify and progress therapeutic interventions, address functional mobility deficits, address ROM deficits, address strength deficits, analyze and address soft tissue restrictions, analyze and cue movement patterns and address imbalance/dizziness to attain remaining goals.    Progress toward goals / Updated goals:    Progressing strength and stability     PLAN  [x]  Upgrade activities as tolerated YES Continue plan of care   []  Discharge due to :    []  Other:      Therapist: Elaina Corbin PT    Date: 1/31/2018 Time: 5:18 PM     Future Appointments  Date Time Provider Concetta Peraza   2/2/2018 11:00 AM Elaina Corbin PT REHAB CENTER AT [facility]   2/5/2018 4:30 PM Elaina Corbin, PT REHAB CENTER AT [facility]   2/7/2018 4:00 PM Elaina Corbin, PT REHAB CENTER AT [facility]   2/9/2018 3:00 PM Tyler Laurent PT REHAB CENTER AT [facility]   2/12/2018 5:00 PM Elaina Corbin PT REHAB CENTER AT [facility]   2/14/2018 4:30 PM Elaina Corbin PT REH[facility]ENTER AT [facility]   2/16/2018 3:00 PM Tyler Laurent PT REHAB CENTER AT [facility]   2/19/2018 5:00 PM Elaina Corbin, PT REHAB [facility]ENTER AT [facility]   2/21/2018 4:30 PM Elaina Corbin, PT REHAB CENTER AT [facility]

## 2018-02-02 ENCOUNTER — HOSPITAL ENCOUNTER (OUTPATIENT)
Dept: PHYSICAL THERAPY | Age: 53
Discharge: HOME | End: 2018-02-02
Payer: COMMERCIAL

## 2018-02-02 PROCEDURE — 97140 MANUAL THERAPY 1/> REGIONS: CPT

## 2018-02-02 PROCEDURE — 97110 THERAPEUTIC EXERCISES: CPT

## 2018-02-02 NOTE — PROGRESS NOTES
PHYSICAL THERAPY - DAILY TREATMENT NOTE    Patient Name: Ralph Diallo        Date: 2018  : 1965    Patient  Verified: YES  Visit #:   15   of   20  Insurance: Payor: Carl Breed / Plan: Jareth Bald / Product Type: [employer] /      In time: 2:35 Out time: 3:30   Total Treatment Time: 55     Medicare Time Tracking (below)   Total Timed Codes (min):  - 1:1 Treatment Time:  -     TREATMENT AREA/ DIAGNOSIS = Right hip pain [M25.551]    SUBJECTIVE  Pain Level (on 0 to 10 scale):  2  / 10   Medication Changes/New allergies or changes in medical history, any new surgeries or procedures? NO    If yes, update Summary List   Subjective Functional Status/Changes:  []  No changes reported     Functional improvement: Pt reports that she does feel a little better after last visit. Functional limitation: Pt still stiff in the hip and feels like the muscles are tight around the hip. OBJECTIVE      10 min Manual Therapy: DTM to R hip flexors and TFL   Rationale:      decrease pain and increase tissue extensibility to improve patient's ability to perform ADLs without pain    45 min Therapeutic Exercise:  [x]  See flow sheet   Rationale:      increase strength, improve coordination and improve balance to improve the patients ability to perform ADLs without pain          min Patient Education:  Yes    [x] Reviewed HEP   []  Progressed/Changed HEP based on: Other Objective/Functional Measures:    Pt had no increase in pain during treatment session. Pt has full ROM in R Hip   Post Treatment Pain Level (on 0 to 10) scale:   2  / 10     ASSESSMENT  Assessment/Changes in Function:     Pt still has decreased endurance of the R hip musculature. Pt requires occasional to min verbal and tactile instructions from proper mechanics with therapeutic exercises. Pt had increased muscle tone in the R TFL and hip flexors. Pt benefited from manual therapy based on decreased symptoms post treatment.       [] See Progress Note/Recertification   Patient will continue to benefit from skilled PT services to modify and progress therapeutic interventions, address functional mobility deficits, address ROM deficits, address strength deficits, analyze and address soft tissue restrictions, analyze and cue movement patterns, analyze and modify body mechanics/ergonomics and assess and modify postural abnormalities to attain remaining goals. Progress toward goals / Updated goals:    Continue with current treatment plan targeting R hip strengthening and endurance of stabilization musculature.       PLAN  [x]  Upgrade activities as tolerated YES Continue plan of care   []  Discharge due to :    []  Other:      Therapist: Lucille Klein    Date: 2/2/2018 Time: 3:31 PM        Future Appointments  Date Time Provider Concetta Peraza   2/5/2018 4:30 PM Tiana Desai, PT [facility]   2/7/2018 4:00 PM Tiana Desai, PT [facility]   2/9/2018 3:00 PM Maynor Smith, PT [facility] AT [facility]   2/12/2018 5:00 PM Tiana Desai, PT [facility] AT [facility]   2/14/2018 4:30 PM Tiana Desai, PT [facility] AT [facility]   2/16/2018 3:00 PM Maynor Smith, PT [facility] AT [facility]   2/19/2018 5:00 PM Tiana Desai, PT [facility] AT [facility]   2/21/2018 4:30 PM Tiana Desai, PT [facility] AT [facility]

## 2018-02-05 ENCOUNTER — HOSPITAL ENCOUNTER (OUTPATIENT)
Dept: PHYSICAL THERAPY | Age: 53
Discharge: HOME | End: 2018-02-05
Payer: COMMERCIAL

## 2018-02-05 PROCEDURE — 97140 MANUAL THERAPY 1/> REGIONS: CPT

## 2018-02-05 PROCEDURE — 97110 THERAPEUTIC EXERCISES: CPT

## 2018-02-05 NOTE — PROGRESS NOTES
PHYSICAL THERAPY - DAILY TREATMENT NOTE    Patient Name: Jeff Campos        Date: 2018  : 1965   YES Patient  Verified  Visit #:   15   of   20  Insurance: Payor: Merissa Living / Plan: Lemkomely Lennox / Product Type: [employer] /      In time: 440 Out time: 545   Total Treatment Time: 65     Medicare Time Tracking (below)   Total Timed Codes (min):   1:1 Treatment Time:       TREATMENT AREA = Right hip pain [M25.551]    SUBJECTIVE  Pain Level (on 0 to 10 scale):  2   10   Medication Changes/New allergies or changes in medical history, any new surgeries or procedures? NO    If yes, update Summary List   Subjective Functional Status/Changes:  []  No changes reported     Stiff--but improving.   I tried to step up onto a picnic bench but was unable to do so        OBJECTIVE    50 min Therapeutic Exercise:  [x]  See flow sheet   Rationale:      increase ROM, increase strength and improve coordination to improve the patients ability to return to recreational activity     15 min Manual Therapy: Technique:      [x] STM[]IASTM[x]TPR[]PROM[] Stretching  [] SOR[] man traction[x] psoas/ piriformis release  []OP with REIL  []Jt manipulation []Gr I [] II []  III [] IV[] V[]    Treatment Area:  R hip   Rationale:      decrease pain, increase ROM, increase tissue extensibility and decrease trigger points to improve patient's ability to return to recreational activity        Other Objective/Functional Measures:    Initiated agility activity     Post Treatment Pain Level (on 0 to 10) scale:   2-3  / 10     ASSESSMENT  Assessment/Changes in Function:     Functional improvement:  RTW--notes improved endurance        []  See Progress Note/Recertification   Patient will continue to benefit from skilled PT services to modify and progress therapeutic interventions, address functional mobility deficits, address ROM deficits, address strength deficits, analyze and address soft tissue restrictions and analyze and cue movement patterns to attain remaining goals.    Progress toward goals / Updated goals:    Steady progress     PLAN  [x]  Upgrade activities as tolerated YES Continue plan of care   []  Discharge due to :    []  Other:      Therapist: Chelsey Arredondo PT    Date: 2/5/2018 Time: 4:57 PM     Future Appointments  Date Time Provider Concetta Peraza   2/7/2018 4:00 PM Chelsey Arredondo PT [facility]   2/9/2018 3:00 PM Daniel Plascencia, PT [facility]   2/12/2018 5:00 PM Chelsey Arredondo PT [facility]   2/14/2018 4:30 PM Chelsey Arredondo PT [facility]   2/16/2018 3:00 PM Daniel Plascencia, PT [facility]   2/19/2018 5:00 PM Chelsey Arredondo PT [facility]   2/21/2018 4:30 PM Chelsey Arredondo PT [facility]

## 2018-02-07 ENCOUNTER — HOSPITAL ENCOUNTER (OUTPATIENT)
Dept: PHYSICAL THERAPY | Age: 53
Discharge: HOME | End: 2018-02-07
Payer: COMMERCIAL

## 2018-02-07 PROCEDURE — 97110 THERAPEUTIC EXERCISES: CPT

## 2018-02-07 PROCEDURE — 97140 MANUAL THERAPY 1/> REGIONS: CPT

## 2018-02-07 NOTE — PROGRESS NOTES
PHYSICAL THERAPY - DAILY TREATMENT NOTE    Patient Name: Aurelio Duane        Date: 2018  : 1965   YES Patient  Verified  Visit #:     Insurance: Payor: Teresita Quiles / Plan: [employer] / Product Type: [employer] /      In time: 400 Out time: 515   Total Treatment Time: 75     Medicare Time Tracking (below)   Total Timed Codes (min):   1:1 Treatment Time:       TREATMENT AREA = Right hip pain [M25.551]    SUBJECTIVE  Pain Level (on 0 to 10 scale):  3  / 10   Medication Changes/New allergies or changes in medical history, any new surgeries or procedures? NO    If yes, update Summary List   Subjective Functional Status/Changes:  []  No changes reported     More sore where everything attaches. Did a lot of TP work with lacrosse ball/ tennis ball after walking the dog last night. OBJECTIVE    60 min Therapeutic Exercise:  [x]  See flow sheet   Rationale:      increase ROM, increase strength and improve balance to improve the patients ability to perform recreational activity     15 min Manual Therapy: Technique:      [x] STM[]IASTM[x]TPR[]PROM[x] Stretching- ER/ IR, ext  [] SOR[] man traction[x] psoas/ piriformis release  []OP with REIL  []Jt manipulation []Gr I [] II []  III [] IV[] V[]    Treatment Area:  R hip   Rationale:      decrease pain, increase ROM, increase tissue extensibility and decrease trigger points to improve patient's ability to perform ADLs and recreation activity        throughout Rx min Patient Education:  YES  Reviewed HEP   []  Progressed/Changed HEP based on:            Other Objective/Functional Measures:         Post Treatment Pain Level (on 0 to 10) scale:   3-4  / 10     ASSESSMENT  Assessment/Changes in Function:     Functional improvement:  Progressing proprioception/ agility activity- per worksheet  Functional limitation:  Persistent tightness      []  See Progress Note/Recertification   Patient will continue to benefit from skilled PT services to modify and progress therapeutic interventions, address functional mobility deficits, address ROM deficits, address strength deficits, analyze and address soft tissue restrictions, analyze and cue movement patterns and address imbalance/dizziness to attain remaining goals.    Progress toward goals / Updated goals:    Steady progress for this stage post op     PLAN  [x]  Upgrade activities as tolerated YES Continue plan of care   []  Discharge due to :    []  Other:      Therapist: Kirsten Grant PT    Date: 2/7/2018 Time: 4:13 PM     Future Appointments  Date Time Provider Concetta Peraza   2/9/2018 3:00 PM Titus Polk, PT [facility] AT [facility]   2/12/2018 5:00 PM Kirsten Grant, PT [facility]   2/14/2018 4:30 PM Kirsten Grant, PT [facility]   2/16/2018 3:00 PM Titus Polk, PT [facility]   2/19/2018 5:00 PM Kirsten Grant, PT [facility]   2/21/2018 4:30 PM Kirsten Grant, PT [facility] AT [facility]

## 2018-02-09 ENCOUNTER — HOSPITAL ENCOUNTER (OUTPATIENT)
Dept: PHYSICAL THERAPY | Age: 53
Discharge: HOME | End: 2018-02-09
Payer: COMMERCIAL

## 2018-02-09 PROCEDURE — 97140 MANUAL THERAPY 1/> REGIONS: CPT

## 2018-02-09 PROCEDURE — 97110 THERAPEUTIC EXERCISES: CPT

## 2018-02-09 NOTE — PROGRESS NOTES
PHYSICAL THERAPY - DAILY TREATMENT NOTE    Patient Name: Pratima Calvillo        Date: 2018  : 1965    Patient  Verified: YES  Visit #:     Insurance: Payor: Luis Codding / Plan: Coni Galeana / Product Type: [employer] /      In time: 2:50 Out time: 3:15   Total Treatment Time: 85     Medicare Time Tracking (below)   Total Timed Codes (min):    1:1 Treatment Time:        TREATMENT AREA/ DIAGNOSIS = Right hip pain [M25.551]    SUBJECTIVE  Pain Level (on 0 to 10 scale):  3  / 10   Medication Changes/New all ergies or changes in medical history, any new surgeries or procedures?     NO    If yes, update Summary List   Subjective Functional Status/Changes:  []  No changes reported     Functional improvement    Functional limitation my R low back hurts      OBJECTIVE  Modalities Rationale:     decrease edema, decrease inflammation and decrease pain to improve patient's ability to perform ADLs without pain   min [] Estim, type/location:                                      []  att     []  unatt     []  w/US     []  w/ice    []  w/heat    min []  Mechanical Traction: type/lbs                   []  pro   []  sup   []  int   []  cont    []  before manual    []  after manual    min []  Ultrasound, settings/location:      min []  Iontophoresis w/ dexamethasone, location:                                               []  take home patch       []  in clinic   10 min [x]  Ice     []  Heat    location/position:     min []  Vasopneumatic Device, press/temp:     min []  Other:    [x] Skin assessment post-treatment (if applicable):    [x]  intact    [x]  redness- no adverse reaction     []redness - adverse reaction:        10 min Manual Therapy: MET to correct R ant innominate X 3   Rationale:      decrease pain, increase ROM and increase tissue extensibility to improve patient's ability to perform ADLs without pain    65 min Therapeutic Exercise:  [x]  See flow sheet   Rationale:      increase ROM, increase strength and improve balance to improve the patients ability to perform ADLs without pain          min Patient Education:  Yes    [x] Reviewed HEP   []  Progressed/Changed HEP based on: Other Objective/Functional Measures:     pt with R ant innominate, corrected with MET, it went out again 2x during the session and was corrected 2 more times. Increased PREs    Post Treatment Pain Level (on 0 to 10) scale:   0  / 10     ASSESSMENT  Assessment/Changes in Function:     Pts' R ant innominate easily corrected, but quickly goes back out  Pt advised to use MET (R foot pressing into door jam in supine), # x 10 reps       []  See Progress Note/Recertification   Patient will continue to benefit from skilled PT services to modify and progress therapeutic interventions, address functional mobility deficits, address ROM deficits, address strength deficits, analyze and address soft tissue restrictions, analyze and cue movement patterns, analyze and modify body mechanics/ergonomics and assess and modify postural abnormalities to attain remaining goals.    Progress toward goals / Updated goals:    Pt with improving strength, full ROM     PLAN  [x]  Upgrade activities as tolerated YES Continue plan of care   []  Discharge due to :    []  Other:      Therapist: Lashell Clark PT    Date: 2/9/2018 Time: 4:20 PM        Future Appointments  Date Time Provider Concetta Peraza   2/12/2018 5:00 PM Pao Vu PT [facility]   2/14/2018 4:30 PM Pao Vu PT [facility]   2/16/2018 3:00 PM Lashell Clark PT [facility]   2/19/2018 5:00 PM Pao Vu PT [facility]   2/21/2018 4:30 PM Pao Vu, PT [facility] AT [facility]

## 2018-02-12 ENCOUNTER — HOSPITAL ENCOUNTER (OUTPATIENT)
Dept: PHYSICAL THERAPY | Age: 53
Discharge: HOME | End: 2018-02-12
Payer: COMMERCIAL

## 2018-02-12 PROCEDURE — 97110 THERAPEUTIC EXERCISES: CPT

## 2018-02-12 PROCEDURE — 97140 MANUAL THERAPY 1/> REGIONS: CPT

## 2018-02-12 NOTE — PROGRESS NOTES
PHYSICAL THERAPY - DAILY TREATMENT NOTE    Patient Name: Nicolette Oswald        Date: 2018  : 1965   YES Patient  Verified  Visit #:     Insurance: Payor: Portia Zambrano / Plan: [employer] / Product Type: [employer] /      In time: 500 Out time: 600   Total Treatment Time: 60     Medicare Time Tracking (below)   Total Timed Codes (min):   1:1 Treatment Time:       TREATMENT AREA = Right hip pain [M25.551]    SUBJECTIVE  Pain Level (on 0 to 10 scale):  2  / 10   Medication Changes/New allergies or changes in medical history, any new surgeries or procedures? NO    If yes, update Summary List   Subjective Functional Status/Changes:  []  No changes reported     Did a lot of fascia work this weekend and it feels better. OBJECTIVE    45 min Therapeutic Exercise:  [x]  See flow sheet   Rationale:      increase strength, improve coordination and improve balance to improve the patients ability to return to sport activity     15 min Manual Therapy: Technique:      - [id number]- Stretching  - SOR- man traction- psoas/ piriformis release, MET  -OP with REIL  -Jt manipulation -Gr I - II -  III - IV- V-    Treatment Area:  R hip   Rationale:      decrease pain, increase ROM, increase tissue extensibility and decrease trigger points to improve patient's ability to perform sport activity            throughout Rx min Patient Education:  YES  Reviewed HEP   []  Progressed/Changed HEP based on:            Other Objective/Functional Measures:    Noted R ant rotation- improved after MET     Post Treatment Pain Level (on 0 to 10) scale:   2  / 10     ASSESSMENT  Assessment/Changes in Function:     Functional improvement:  Did a Yrn class        []  See Progress Note/Recertification   Patient will continue to benefit from skilled PT services to modify and progress therapeutic interventions, address functional mobility deficits, address ROM deficits, address strength deficits, analyze and address soft tissue restrictions and assess and modify postural abnormalities to attain remaining goals.    Progress toward goals / Updated goals:    Improving stability noted with improved form during lunge, RDL     PLAN  [x]  Upgrade activities as tolerated YES Continue plan of care   []  Discharge due to :    []  Other:      Therapist: Radha Vegas PT    Date: 2/12/2018 Time: 5:13 PM     Future Appointments  Date Time Provider Concetta Peraza   2/14/2018 4:30 PM Radha Vegas PT [facility]   2/16/2018 3:00 PM Rolando Webber PT [facility]   2/19/2018 5:00 PM Radha Vegas, PT [facility]   2/21/2018 4:30 PM Radha Vegas, PT [facility]

## 2018-02-14 ENCOUNTER — HOSPITAL ENCOUNTER (OUTPATIENT)
Dept: PHYSICAL THERAPY | Age: 53
Discharge: HOME | End: 2018-02-14
Payer: COMMERCIAL

## 2018-02-14 PROCEDURE — 97140 MANUAL THERAPY 1/> REGIONS: CPT

## 2018-02-14 PROCEDURE — 97110 THERAPEUTIC EXERCISES: CPT

## 2018-02-14 NOTE — PROGRESS NOTES
PHYSICAL THERAPY - DAILY TREATMENT NOTE    Patient Name: Elizabeth Alaniz        Date: 2018  : 1965   YES Patient  Verified  Visit #:   25   of   20  Insurance: Payor: Johnathon Scott / Plan: Cordell Ramirez / Product Type: [employer] /      In time: 430 Out time: 555   Total Treatment Time: 75     Medicare Time Tracking (below)   Total Timed Codes (min):   1:1 Treatment Time:       TREATMENT AREA = Right hip pain [M25.551]    SUBJECTIVE  Pain Level (on 0 to 10 scale):  2  / 10   Medication Changes/New allergies or changes in medical history, any new surgeries or procedures? NO    If yes, update Summary List   Subjective Functional Status/Changes:  []  No changes reported     70% improved . Doctor is discharging me from PT       OBJECTIVE    60 min Therapeutic Exercise:  [x]  See flow sheet   Rationale:      increase ROM, increase strength and improve balance to improve the patients ability to return to recreational activity     15 min Manual Therapy: Technique:      [x] STM[]IASTM[x]TPR[]PROM[] Stretching  [] SOR[] man traction[]   []OP with REIL  []Jt manipulation []Gr I [] II []  III [] IV[] V[]    Treatment Area:  R piriformis/ psoas   Rationale:      decrease pain, increase ROM, increase tissue extensibility and decrease trigger points to improve patient's ability to return to recreational activity             throughout Rx min Patient Education:  YES  Reviewed HEP   []  Progressed/Changed HEP based on: Other Objective/Functional Measures:    ER ROM= 63 degrees  [id number]   Post Treatment Pain Level (on 0 to 10) scale:   2  / 10     ASSESSMENT  Assessment/Changes in Function:          [x]  See Progress Note/Recertification     PLAN  []  Upgrade activities as tolerated YES Continue plan of care   [x]  Discharge due to : MD request   []  Other:      Therapist: Antonio Llamas PT    Date: 2018 Time: 4:47 PM     No future appointments.

## 2018-02-16 ENCOUNTER — APPOINTMENT (OUTPATIENT)
Dept: PHYSICAL THERAPY | Age: 53
End: 2018-02-16
Payer: COMMERCIAL

## 2018-02-19 ENCOUNTER — APPOINTMENT (OUTPATIENT)
Dept: PHYSICAL THERAPY | Age: 53
End: 2018-02-19
Payer: COMMERCIAL

## 2018-02-21 ENCOUNTER — APPOINTMENT (OUTPATIENT)
Dept: PHYSICAL THERAPY | Age: 53
End: 2018-02-21
Payer: COMMERCIAL

## 2018-09-13 NOTE — PROGRESS NOTES
[facility]. [address], [address] [place] [address]  Phone: [phone]  Fax: [phone]  DISCHARGE NOTE            Patient Name: Ambrosio Hauser : 1965   Treatment/Medical Diagnosis: Right hip pain [M25.551]   Referral Source: Mignon Quick MD       Date of Initial Visit: 17 Attended Visits: 18 Missed Visits: 0      SUMMARY OF TREATMENT  Manual therapy, including massage, PROM and Muscle-energy techniques to correct L posterior innominate. Core stability program.  LE strengthening program, with focus on glutes. Education on HEP and self-care. CURRENT STATUS  The patient has made good progress. SHe has full PROM/ AROM  And has returned to more vigorous activities. .             Goal/Measure of Progress Goal Met? 1.  pt to report 75% improvement   Status at last Eval: 35% Current Status: 75% improved yes   2. Independent with HEP   Status at last Eval: progressing Current Status: indep yes   3. Increase FOTO score to >=62%, to indicate increased function    Status at last Eval: 53 Current Status: 58 progressing   4. Restore full AROM to R hip   Status at last Eval: Full R hip AROM, except for ER = 20  Current Status: Full AROM yes      RECOMMENDATIONS  Discharge from PT. Patient is meeting or progressing towards all LTGs indep  If you have any questions/comments please contact us directly at [phone].    Thank you for allowing us to assist in the care of your patient.     Therapist Signature: Patricia Modi PT    Date: 18 at 8:57 am

## 2020-09-02 ENCOUNTER — HOSPITAL ENCOUNTER (OUTPATIENT)
Dept: PHYSICAL THERAPY | Age: 55
Discharge: HOME | End: 2020-09-02
Payer: COMMERCIAL

## 2020-09-02 PROCEDURE — 97161 PT EVAL LOW COMPLEX 20 MIN: CPT

## 2020-09-02 PROCEDURE — 97110 THERAPEUTIC EXERCISES: CPT

## 2020-09-02 NOTE — PROGRESS NOTES
[facility] AT [facility]. [place], [address] - Phone: [phone]  Fax: [phone] / [address] [facility]  Patient Name: Lloyd Salas : 1965   Treatment   Diagnosis: L hip pain Medical   Diagnosis: Pain in left hip [M25.552]   Onset Date: 2020     Referral Source: Ally Milian MD [facility]): 2020   Prior Hospitalization: See medical history Provider #: [id number]   Prior Level of Function: Pt had chronic hip pain with ADLs   Comorbidities: None reported   Medications: Verified on Patient Summary List   The Plan of Care and following information is based on the information from the initial evaluation.   ==================================================================================  Assessment / key information: Pt is a 53 yo female s/p L MILI on 2020 . He/she presents with pain ranging from 3-9/10, located on the anterolateral aspect of the him. Pain is made worse with bending, better with rest and ice. Palpation reveals TTP glut med, piriformis, rectus femoris. Hip PROM: flexion 90deg(stopped due to posterior precautions), abduction 35deg ER 40deg deg. LE MMT:withheld Pt is limited in the following activities: weighbearing activities. Incision looked good and showed no signs of infection. Pt will benefit from PT interventions to address the aforementioned deficits and allow pt to return to PLOF.   Eval Complexity: History LOW Complexity : Zero comorbidities / personal factors that will impact the outcome / POC;  Examination  LOW Complexity : 1-2 Standardized tests and measures addressing body structure, function, activity limitation and / or participation in recreation ; Presentation LOW Complexity : Stable, uncomplicated ;  Decision Making MEDIUM Complexity : FOTO score of 26-74; Overall Complexity LOW ==================================================================================  Problem List: pain affecting function, decrease ROM, decrease strength, impaired gait/ balance, decrease ADL/ functional abilitiies, decrease activity tolerance, decrease flexibility/ joint mobility and decrease transfer abilities   Treatment Plan may include any combination of the following: Therapeutic exercise, Therapeutic activities, Neuromuscular re-education, Physical agent/modality, Gait/balance training, Manual therapy, Patient education, Self Care training, Functional mobility training, Home safety training and Stair training  Patient / Family readiness to learn indicated by: asking questions, trying to perform skills and interest  Persons(s) to be included in education: patient (P)  Barriers to Learning/Limitations: no  Measures taken:    Patient Goal (s): \"get stronger and develop better balance\"   Patient self reported health status: good  Rehabilitation Potential: good   Short Term Goals: To be accomplished in  4  weeks:  1. Pt will be independent and compliant with HEP to decrease pain, increase ROM and return pt to PLOF. 2. Pt will demonstrate a GROC score of >/= +2 to show overall improvement in function     Long Term Goals: To be accomplished in  8  weeks:  1. Increase score on FOTO to > or = to 70 points to demo an increase in functional activity tolerance with the LE. 2. Pt will note < or = 2/10 pain with all mobility to improve comfort with ADLs.    3.Pt will demonstrate a GROC score of >/= +5 to show overall improvement in function  Frequency / Duration:   Patient to be seen  1-2  times per week for 6-8  weeks:  Patient / Caregiver education and instruction: self care, activity modification and exercises    Therapist Signature: Alejandro Owen PT Date: 8/7/6351   Certification Period: - Time: 2:00 PM   ===========================================================================================  I certify that the above Physical Therapy Services are being furnished while the patient is under my care. I agree with the treatment plan and certify that this therapy is necessary. Physician Signature:        Date:       Time:     Please sign and return to [facility] or you may fax the signed copy to [phone]. Thank you.

## 2020-09-02 NOTE — PROGRESS NOTES
PHYSICAL THERAPY - DAILY TREATMENT NOTE    Patient Name: Juan F Bangura        Date: 2020  : 1965    Patient  Verified: YES  Visit #:     Insurance: Payor: Israel Salcedo / Plan: Idolina Baumgarten / Product Type: [employer] /      In time: 1:00 Out time: 1:50   Total Treatment Time: 50     Medicare Time Tracking (below)   Total Timed Codes (min):  - 1:1 Treatment Time:  -     TREATMENT AREA/ DIAGNOSIS = Right hip pain [M25.551]    SUBJECTIVE  Pain Level (on 0 to 10 scale):  4-5   10   Medication Changes/New allergies or changes in medical history, any new surgeries or procedures?     NO    If yes, update Summary List   Subjective Functional Status/Changes:  []  No changes reported     See Eval      OBJECTIVE  Modalities Rationale:     decrease inflammation and decrease pain to improve patient's ability to perform ADLs without pain     min [] Estim, type/location:                                      []  att     []  unatt     []  w/US     []  w/ice    []  w/heat    min []  Mechanical Traction: type/lbs                   []  pro   []  sup   []  int   []  cont    []  before manual    []  after manual    min []  Ultrasound, settings/location:      min []  Iontophoresis w/ dexamethasone, location:                                               []  take home patch       []  in clinic   10 min [x]  Ice     []  Heat    location/position: L hip    min []  Vasopneumatic Device, press/temp:     min []  Other:    [] Skin assessment post-treatment (if applicable):    []  intact    []  redness- no adverse reaction     []redness  adverse reaction:        15 min Therapeutic Exercise:  [x]  See flow sheet   Rationale:      increase strength and improve coordination to improve the patients ability to perform ADLs without pain       Billed With/As:   [x] TE   [] TA   [] Neuro   [] Self Care Patient Education: [x] Review HEP    [] Progressed/Changed HEP based on:   [] positioning   [] body mechanics   [] transfers   [] heat/ice application    [] other:        Other Objective/Functional Measures:    See Eval   Post Treatment Pain Level (on 0 to 10) scale:   4-5  / 10     ASSESSMENT  Assessment/Changes in Function:     See Eval     []  See Progress Note/Recertification   Patient will continue to benefit from skilled PT services to modify and progress therapeutic interventions, address functional mobility deficits, address ROM deficits, address strength deficits, analyze and address soft tissue restrictions and analyze and cue movement patterns to attain remaining goals.    Progress toward goals / Updated goals:    See Eval     PLAN  [x]  Upgrade activities as tolerated YES Continue plan of care   []  Discharge due to :    []  Other:      Therapist: Bravo Vasquez DPT     Date: 9/2/2020 Time: 2:00 PM        Future Appointments   Date Time Provider Concetta Peraza   9/15/2020  4:30 PM Marybelle London [facility]   9/22/2020  2:15 PM Charis Kern [facility]   9/29/2020  2:15 PM Charis Kern [facility]   10/6/2020  4:30 PM Solange Hale PT [facility]

## 2020-09-15 ENCOUNTER — APPOINTMENT (OUTPATIENT)
Dept: PHYSICAL THERAPY | Age: 55
End: 2020-09-15
Payer: COMMERCIAL

## 2020-09-17 ENCOUNTER — HOSPITAL ENCOUNTER (OUTPATIENT)
Dept: PHYSICAL THERAPY | Age: 55
Discharge: HOME | End: 2020-09-17
Payer: COMMERCIAL

## 2020-09-17 PROCEDURE — 97110 THERAPEUTIC EXERCISES: CPT

## 2020-09-17 PROCEDURE — 97140 MANUAL THERAPY 1/> REGIONS: CPT

## 2020-09-17 NOTE — PROGRESS NOTES
1PHYSICAL THERAPY - DAILY TREATMENT NOTE    Patient Name: Geovany Gray        Date: 2020  : 1965    Patient  Verified: YES  Visit #:   2   of   12  Insurance: Payor: Melva Petty / Plan: Yi Pepe / Product Type: [employer] /      In time: 9:55 Out time: 10:50   Total Treatment Time: 55     Medicare Time Tracking (below)   Total Timed Codes (min):  - 1:1 Treatment Time:  -     TREATMENT AREA/ DIAGNOSIS = Pain in left hip [M25.552]    SUBJECTIVE  Pain Level (on 0 to 10 scale):  4  / 10   Medication Changes/New allergies or changes in medical history, any new surgeries or procedures? NO    If yes, update Summary List   Subjective Functional Status/Changes:  []  No changes reported     Pt reports she is still having surgical pain but it has improved      OBJECTIVE  Modalities Rationale:     decrease inflammation and decrease pain to improve patient's ability to perform ADLs without pain     min [] Estim, type/location:                                      []  att     []  unatt     []  w/US     []  w/ice    []  w/heat    min []  Mechanical Traction: type/lbs                   []  pro   []  sup   []  int   []  cont    []  before manual    []  after manual    min []  Ultrasound, settings/location:      min []  Iontophoresis w/ dexamethasone, location:                                               []  take home patch       []  in clinic   10 min [x]  Ice     []  Heat    location/position: L hip    min []  Vasopneumatic Device, press/temp:     min []  Other:    [] Skin assessment post-treatment (if applicable):    []  intact    []  redness- no adverse reaction     []redness  adverse reaction:        30 min Therapeutic Exercise:  [x]  See flow sheet   Rationale:      increase ROM and increase strength to improve the patients ability to perform ADLs without pain       15 min Manual Therapy: PROM to L hip. Trigger point release to TFL, piriformis, and glut med.  Roller to ITB   Rationale: increase tissue extensibility to improve patient's ability to perform ADLs without pain    Billed With/As:   [x] TE   [] TA   [] Neuro   [] Self Care Patient Education: [x] Review HEP    [] Progressed/Changed HEP based on:   [] positioning   [] body mechanics   [] transfers   [] heat/ice application    [] other:        Other Objective/Functional Measures:    PROM WNL of protocol precautions   Post Treatment Pain Level (on 0 to 10) scale:   0  / 10     ASSESSMENT  Assessment/Changes in Function:     Pt shows great form with therapeutic exercise     []  See Progress Note/Recertification   Patient will continue to benefit from skilled PT services to modify and progress therapeutic interventions, address functional mobility deficits, address ROM deficits and address strength deficits to attain remaining goals.    Progress toward goals / Updated goals:    Good Progress to    [] STG    [x] LTG  1 as shown by improved strength needed for ADLs     PLAN  [x]  Upgrade activities as tolerated YES Continue plan of care   []  Discharge due to :    []  Other:      Therapist: Yung Portillo DPT     Date: 9/17/2020 Time: 10:13 AM        Future Appointments   Date Time Provider Concetta Peraza   9/22/2020  2:15 PM Marshall Neri [facility]   9/29/2020  2:15 PM Marshall Neri [facility]   10/6/2020  4:30 PM Dev Cornell PT [facility]

## 2020-09-22 ENCOUNTER — HOSPITAL ENCOUNTER (OUTPATIENT)
Dept: PHYSICAL THERAPY | Age: 55
Discharge: HOME | End: 2020-09-22
Payer: COMMERCIAL

## 2020-09-22 PROCEDURE — 97110 THERAPEUTIC EXERCISES: CPT

## 2020-09-22 PROCEDURE — 97140 MANUAL THERAPY 1/> REGIONS: CPT

## 2020-09-22 NOTE — PROGRESS NOTES
PHYSICAL THERAPY - DAILY TREATMENT NOTE    Patient Name: Destiny Franklin        Date: 2020  : 1965    Patient  Verified: YES  Visit #:   3   of   12  Insurance: Payor: Lupe Maguire / Plan: Pedro Parson / Product Type: [employer] /      In time: 2:15 Out time: 3:05   Total Treatment Time: 50     Medicare Time Tracking (below)   Total Timed Codes (min):  - 1:1 Treatment Time:  -     TREATMENT AREA/ DIAGNOSIS = Pain in left hip [M25.552]    SUBJECTIVE  Pain Level (on 0 to 10 scale):  3-4  / 10   Medication Changes/New allergies or changes in medical history, any new surgeries or procedures? NO    If yes, update Summary List   Subjective Functional Status/Changes:  []  No changes reported     Pt reports she is still having some hip pain with long walking. OBJECTIVE  Modalities Rationale:     decrease inflammation and decrease pain to improve patient's ability to perform ADLs without pain     min [] Estim, type/location:                                      []  att     []  unatt     []  w/US     []  w/ice    []  w/heat    min []  Mechanical Traction: type/lbs                   []  pro   []  sup   []  int   []  cont    []  before manual    []  after manual    min []  Ultrasound, settings/location:      min []  Iontophoresis w/ dexamethasone, location:                                               []  take home patch       []  in clinic   10 min [x]  Ice     []  Heat    location/position: L hip    min []  Vasopneumatic Device, press/temp:     min []  Other:    [] Skin assessment post-treatment (if applicable):    []  intact    []  redness- no adverse reaction     []redness  adverse reaction:        30 min Therapeutic Exercise:  [x]  See flow sheet   Rationale:      increase ROM and increase strength to improve the patients ability to perform ADLs without pain       10 min Manual Therapy: Trigger point release to piriformis, glut med, tfl.  MET to correct L anterior innominate   Rationale: increase ROM and increase tissue extensibility to improve patient's ability to perform ADLs without pain    Billed With/As:   [x] TE   [] TA   [] Neuro   [] Self Care Patient Education: [x] Review HEP    [] Progressed/Changed HEP based on:   [] positioning   [] body mechanics   [] transfers   [] heat/ice application    [] other:        Other Objective/Functional Measures:    Full PROM within protocol   Post Treatment Pain Level (on 0 to 10) scale:   0  / 10     ASSESSMENT  Assessment/Changes in Function:     Pt had decreased pain and improved activity tolerance after MET correction     []  See Progress Note/Recertification   Patient will continue to benefit from skilled PT services to modify and progress therapeutic interventions, address functional mobility deficits, address ROM deficits, address strength deficits, analyze and address soft tissue restrictions and analyze and cue movement patterns to attain remaining goals.    Progress toward goals / Updated goals:    Good Progress to    [] STG    [x] LTG  1 as shown by improved ADL ability     PLAN  [x]  Upgrade activities as tolerated YES Continue plan of care   []  Discharge due to :    []  Other:      Therapist: Giselle Potts DPT     Date: 9/22/2020 Time: 12:36 PM        Future Appointments   Date Time Provider Concetta Peraza   9/22/2020  2:15 PM Loann Ditto [facility]   9/29/2020  2:15 PM Loann Ditto [facility]   10/6/2020  4:30 PM Bettye Nava PT [facility]

## 2020-09-29 ENCOUNTER — HOSPITAL ENCOUNTER (OUTPATIENT)
Dept: PHYSICAL THERAPY | Age: 55
Discharge: HOME | End: 2020-09-29
Payer: COMMERCIAL

## 2020-09-29 PROCEDURE — 97140 MANUAL THERAPY 1/> REGIONS: CPT

## 2020-09-29 PROCEDURE — 97110 THERAPEUTIC EXERCISES: CPT

## 2020-09-29 NOTE — PROGRESS NOTES
PHYSICAL THERAPY - DAILY TREATMENT NOTE    Patient Name: Hilario Shadow        Date: 2020  : 1965    Patient  Verified: YES  Visit #:   4   of   5  Insurance: Payor: Christian Saavedra / Plan: Vianney Pearce / Product Type: [employer] /      In time: 2:15 Out time: 2:55   Total Treatment Time: 40     Medicare Time Tracking (below)   Total Timed Codes (min):  - 1:1 Treatment Time:  -     TREATMENT AREA/ DIAGNOSIS = Pain in left hip [M25.552]    SUBJECTIVE  Pain Level (on 0 to 10 scale):  0  / 10   Medication Changes/New allergies or changes in medical history, any new surgeries or procedures? NO    If yes, update Summary List   Subjective Functional Status/Changes:  []  No changes reported     Pt reports she isnt having any pain. OBJECTIVE    30 min Therapeutic Exercise:  [x]  See flow sheet   Rationale:      increase strength and improve coordination to improve the patients ability to perform ADLs without pain       10 min Manual Therapy: PROM to L hip. Trigger point release to TFL, glut med    Rationale:      decrease pain, increase ROM and increase tissue extensibility to improve patient's ability to perform ADLs without pain    Billed With/As:   [x] TE   [] TA   [] Neuro   [] Self Care Patient Education: [x] Review HEP    [] Progressed/Changed HEP based on:   [] positioning   [] body mechanics   [] transfers   [] heat/ice application    [] other:        Other Objective/Functional Measures:    Full ROM within current protocol guidelines   Post Treatment Pain Level (on 0 to 10) scale:   0  / 10     ASSESSMENT  Assessment/Changes in Function:     Pt requires minimal cueing for proper mechanics with exercises.  Improved strength based on incrreased resistance with exercises     []  See Progress Note/Recertification   Patient will continue to benefit from skilled PT services to modify and progress therapeutic interventions, address functional mobility deficits, address ROM deficits, address Test Reason : PREOP

Blood Pressure : ***/*** mmHG

Vent. Rate : 046 BPM     Atrial Rate : 046 BPM

   P-R Int : 228 ms          QRS Dur : 094 ms

    QT Int : 462 ms       P-R-T Axes : 024 028 043 degrees

   QTc Int : 404 ms

 

Marked sinus bradycardia with 1st degree A-V block

Abnormal ECG

No previous ECGs available

Confirmed by DONNIE FRITZ ([id number]) on 12/21/2017 2:31:51 PM

 

Referred By:  KEON           Confirmed By:DONNIE FRITZ strength deficits, analyze and address soft tissue restrictions and analyze and cue movement patterns to attain remaining goals.    Progress toward goals / Updated goals:    Good Progress to    [] STG    [x] LTG  1 as shown by good mobility needed for ADLs     PLAN  [x]  Upgrade activities as tolerated YES Continue plan of care   []  Discharge due to :    []  Other:      Therapist: Olivia Herring DPT     Date: 9/29/2020 Time: 2:32 PM        Future Appointments   Date Time Provider Concetta Peraza   10/6/2020  4:30 PM Patsy Lamas PT [facility] No pertinent past medical history

## 2020-10-06 ENCOUNTER — APPOINTMENT (OUTPATIENT)
Dept: PHYSICAL THERAPY | Age: 55
End: 2020-10-06

## 2020-11-25 NOTE — PROGRESS NOTES
[address] [facility] PHYSICAL THERAPY AT [facility] [address] [place]  Phone: [phone]  Fax: [phone] SUMMARY  Patient Name: Trey Martinez : 1965   Treatment/Medical Diagnosis: Pain in left hip [M25.552]   Referral Source: Marialuisa Plascencia MD     Date of Initial Visit: 2020 Attended Visits: 4 Missed Visits: -     SUMMARY OF TREATMENT  PT consisted of manual therapy techniques, therapeutic exercises, and modalities to improve strength, improve mobility, decrease pain, and improve overall function. CURRENT STATUS/ GOAL STATUS  Unable to further assess progress towards goals at this time due to non-compliance/lack of attendance. DC at this time with no further instructions to the patient. Thank you for this referral.    RECOMMENDATIONS  Discontinue therapy due to lack of attendance or compliance. If you have any questions/comments please contact us directly at [phone]. Thank you for allowing us to assist in the care of your patient.     Therapist Signature: Tiana Garcia Date: 2020   Reporting Period:   - Time: 4:12 PM